# Patient Record
(demographics unavailable — no encounter records)

---

## 2025-04-14 NOTE — HEALTH RISK ASSESSMENT
[Never] : Never [2] : 2) Feeling down, depressed, or hopeless for more than half of the days (2) [PHQ-2 Positive] : PHQ-2 Positive [I have developed a follow-up plan documented below in the note.] : I have developed a follow-up plan documented below in the note. [[id number]] : 4

## 2025-04-14 NOTE — HISTORY OF PRESENT ILLNESS
[FreeTextEntry8] : 56-year-old female presenting to [facility]. Patient had a CPE in September 2024.  PMH: Anxiety, Obesity  PSH: Cholecystectomy, ovarian cystectomy, tubal ligation FMH: Mother-stomach cancer, father-liver cancer, sister-cardiac pacemaker, Brother - Leukemia SH: Works as a cook 6days/week. Has a 35-year-old daughter and 32-year-old son. Her  was deported 5 years ago to [facility]  Patient is from [facility].  Meds: Calcium   Therapist: Sees the preach at [facility] [employer] - once a week who passed away recently.  Pap smear 10/2023 Mammogram: 10/2023 Bone density: 7/10/2018 Colonoscopy: 01/2018 - f/u in 10years - Had it with Dr. Mandujano.

## 2025-04-14 NOTE — ASSESSMENT
[FreeTextEntry1] : 56-year-old female presenting to establish care. Reviewed patient past history from chart. Reviewed history with patient. Patient has labs with her from September 2024 - reviewed, normal except slightly elevated cholesterol levels.  Discussed diet and exercise in detail.  Has anxiety and depression due to situation with  - used to speak to the  at [facility] [employer]. WIll refer for therapist.  c/o abdominal discomfort/bloating - normal BM. Discussed diet. has a history of h.pylori - will retest as patient has been travelling.

## 2025-04-14 NOTE — HISTORY OF PRESENT ILLNESS
[FreeTextEntry8] : 56-year-old female presenting to [facility]. Patient had a CPE in September 2024.  PMH: Anxiety, Obesity  PSH: Cholecystectomy, ovarian cystectomy, tubal ligation FMH: Mother-stomach cancer, father-liver cancer, sister-cardiac pacemaker, Brother - Leukemia SH: Works as a cook 6days/week. Has a 35-year-old daughter and 32-year-old son. Her  was deported 5 years ago to [facility]  Patient is from [facility].  Meds: Calcium   Therapist: Sees the preach at [facility] [employer] - once a week who passed away recently.  Pap smear 10/2023 Mammogram: 10/2023 Bone density: 7/10/2018 Colonoscopy: 01/2018 - f/u in 10years - Had it with Dr. Mandujano.

## 2025-04-14 NOTE — ASSESSMENT
[FreeTextEntry1] : 56-year-old female presenting to establish care. Reviewed patient past history from chart. Reviewed history with patient. Patient has labs with her from September 2024 - reviewed, normal except slightly elevated cholesterol levels.  Discussed diet and exercise in detail.  Has anxiety and depression due to situation with  - [facility]  [facility] [employer]. WIll refer for therapist.  c/o abdominal discomfort/bloating - normal BM. Discussed diet. has a history of h.pylori - will retest as patient has been travelling.

## 2025-04-14 NOTE — HEALTH RISK ASSESSMENT
[Never] : Never [2] : 2) Feeling down, depressed, or hopeless for more than half of the days (2) [PHQ-2 Positive] : PHQ-2 Positive [I have developed a follow-up plan documented below in the note.] : I have developed a follow-up plan documented below in the note. [[id number]] : 4